# Patient Record
Sex: FEMALE | Race: WHITE | NOT HISPANIC OR LATINO | URBAN - METROPOLITAN AREA
[De-identification: names, ages, dates, MRNs, and addresses within clinical notes are randomized per-mention and may not be internally consistent; named-entity substitution may affect disease eponyms.]

---

## 2019-05-29 ENCOUNTER — APPOINTMENT (RX ONLY)
Dept: URBAN - METROPOLITAN AREA CLINIC 44 | Facility: CLINIC | Age: 74
Setting detail: DERMATOLOGY
End: 2019-05-29

## 2019-05-29 ENCOUNTER — APPOINTMENT (RX ONLY)
Dept: URBAN - METROPOLITAN AREA CLINIC 45 | Facility: CLINIC | Age: 74
Setting detail: DERMATOLOGY
End: 2019-05-29

## 2019-05-29 DIAGNOSIS — L85.3 XEROSIS CUTIS: ICD-10-CM

## 2019-05-29 DIAGNOSIS — L40.0 PSORIASIS VULGARIS: ICD-10-CM

## 2019-05-29 PROBLEM — Z92.3 PERSONAL HISTORY OF IRRADIATION: Status: ACTIVE | Noted: 2019-05-29

## 2019-05-29 PROBLEM — Z85.3 PERSONAL HISTORY OF MALIGNANT NEOPLASM OF BREAST: Status: ACTIVE | Noted: 2019-05-29

## 2019-05-29 PROBLEM — L23.7 ALLERGIC CONTACT DERMATITIS DUE TO PLANTS, EXCEPT FOOD: Status: ACTIVE | Noted: 2019-05-29

## 2019-05-29 PROBLEM — E13.9 OTHER SPECIFIED DIABETES MELLITUS WITHOUT COMPLICATIONS: Status: ACTIVE | Noted: 2019-05-29

## 2019-05-29 PROBLEM — H91.90 UNSPECIFIED HEARING LOSS, UNSPECIFIED EAR: Status: ACTIVE | Noted: 2019-05-29

## 2019-05-29 PROBLEM — E78.5 HYPERLIPIDEMIA, UNSPECIFIED: Status: ACTIVE | Noted: 2019-05-29

## 2019-05-29 PROBLEM — L70.0 ACNE VULGARIS: Status: ACTIVE | Noted: 2019-05-29

## 2019-05-29 PROBLEM — L29.8 OTHER PRURITUS: Status: ACTIVE | Noted: 2019-05-29

## 2019-05-29 PROBLEM — I10 ESSENTIAL (PRIMARY) HYPERTENSION: Status: ACTIVE | Noted: 2019-05-29

## 2019-05-29 PROBLEM — M12.9 ARTHROPATHY, UNSPECIFIED: Status: ACTIVE | Noted: 2019-05-29

## 2019-05-29 PROCEDURE — 99203 OFFICE O/P NEW LOW 30 MIN: CPT

## 2019-05-29 PROCEDURE — ? COUNSELING

## 2019-05-29 PROCEDURE — ? PRESCRIPTION

## 2019-05-29 PROCEDURE — ? ADDITIONAL NOTES

## 2019-05-29 RX ORDER — CALCIPOTRIENE 50 UG/G
1 CREAM TOPICAL BID
Qty: 1 | Refills: 3 | Status: ERX | COMMUNITY
Start: 2019-05-29

## 2019-05-29 RX ORDER — FLUOCINONIDE 0.5 MG/ML
1 SOLUTION TOPICAL QD
Qty: 1 | Refills: 3 | Status: ERX | COMMUNITY
Start: 2019-05-29

## 2019-05-29 RX ORDER — TRIAMCINOLONE ACETONIDE 1 MG/G
1 CREAM TOPICAL TWICE DAILY
Qty: 1 | Refills: 3 | Status: ERX | COMMUNITY
Start: 2019-05-29

## 2019-05-29 RX ADMIN — CALCIPOTRIENE 1: 50 CREAM TOPICAL at 13:30

## 2019-05-29 RX ADMIN — FLUOCINONIDE 1: 0.5 SOLUTION TOPICAL at 13:32

## 2019-05-29 RX ADMIN — TRIAMCINOLONE ACETONIDE 1: 1 CREAM TOPICAL at 13:33

## 2019-05-29 ASSESSMENT — LOCATION DETAILED DESCRIPTION DERM
LOCATION DETAILED: RIGHT POSTAURICULAR SKIN
LOCATION DETAILED: LEFT ANTECUBITAL SKIN
LOCATION DETAILED: RIGHT ANTECUBITAL SKIN
LOCATION DETAILED: RIGHT POSTAURICULAR SKIN
LOCATION DETAILED: RIGHT INFERIOR MEDIAL UPPER BACK
LOCATION DETAILED: RIGHT ANTECUBITAL SKIN
LOCATION DETAILED: LEFT DISTAL PRETIBIAL REGION
LOCATION DETAILED: RIGHT INFERIOR MEDIAL UPPER BACK
LOCATION DETAILED: RIGHT DISTAL PRETIBIAL REGION
LOCATION DETAILED: LEFT ANTECUBITAL SKIN
LOCATION DETAILED: LEFT DISTAL PRETIBIAL REGION
LOCATION DETAILED: RIGHT DISTAL PRETIBIAL REGION

## 2019-05-29 ASSESSMENT — LOCATION SIMPLE DESCRIPTION DERM
LOCATION SIMPLE: RIGHT PRETIBIAL REGION
LOCATION SIMPLE: LEFT PRETIBIAL REGION
LOCATION SIMPLE: RIGHT UPPER BACK
LOCATION SIMPLE: RIGHT UPPER BACK
LOCATION SIMPLE: LEFT PRETIBIAL REGION
LOCATION SIMPLE: LEFT UPPER ARM
LOCATION SIMPLE: RIGHT UPPER ARM
LOCATION SIMPLE: LEFT UPPER ARM
LOCATION SIMPLE: POSTERIOR SCALP
LOCATION SIMPLE: POSTERIOR SCALP
LOCATION SIMPLE: RIGHT PRETIBIAL REGION
LOCATION SIMPLE: RIGHT UPPER ARM

## 2019-05-29 ASSESSMENT — LOCATION ZONE DERM
LOCATION ZONE: LEG
LOCATION ZONE: TRUNK
LOCATION ZONE: SCALP
LOCATION ZONE: TRUNK
LOCATION ZONE: ARM
LOCATION ZONE: ARM
LOCATION ZONE: LEG
LOCATION ZONE: SCALP

## 2019-05-29 ASSESSMENT — BSA PSORIASIS
% BODY COVERED IN PSORIASIS: 3
% BODY COVERED IN PSORIASIS: 3

## 2019-05-29 ASSESSMENT — PGA PSORIASIS
PGA PSORIASIS: MODERATE (MODERATE PLAQUE ELEVATION, MODERATE ERYTHEMA, COARSE SCALE PREDOMINATES)
PGA PSORIASIS: MODERATE (MODERATE PLAQUE ELEVATION, MODERATE ERYTHEMA, COARSE SCALE PREDOMINATES)

## 2019-05-29 NOTE — PROCEDURE: MIPS QUALITY
Quality 111:Pneumonia Vaccination Status For Older Adults: Pneumococcal Vaccination Previously Received
Name And Contact Information For Health Care Proxy: Jose Roberto Raygoza 
Quality 130: Documentation Of Current Medications In The Medical Record: Current Medications Documented
Quality 431: Preventive Care And Screening: Unhealthy Alcohol Use - Screening: Patient screened for unhealthy alcohol use using a single question and scores less than 2 times per year
Quality 226: Preventive Care And Screening: Tobacco Use: Screening And Cessation Intervention: Patient screened for tobacco use, is a smoker AND received Cessation Counseling
Quality 47: Advance Care Plan: Advance Care Planning discussed and documented; advance care plan or surrogate decision maker documented in the medical record.
Detail Level: Detailed

## 2019-05-29 NOTE — HPI: RASH
What Type Of Note Output Would You Prefer (Optional)?: Standard Output
How Severe Is Your Rash?: mild
Is This A New Presentation, Or A Follow-Up?: Rash
Additional History: Patient was prescribed CLOBETSOL ointment by another dermatologist but her Dr. Lachelle Green took her off of it because the steroids made her blood sugar go up because she is a diabetic. Dr. Delgadillo is her PCP.

## 2019-05-29 NOTE — PROCEDURE: MIPS QUALITY
Quality 431: Preventive Care And Screening: Unhealthy Alcohol Use - Screening: Patient screened for unhealthy alcohol use using a single question and scores less than 2 times per year
Quality 111:Pneumonia Vaccination Status For Older Adults: Pneumococcal Vaccination Previously Received
Quality 226: Preventive Care And Screening: Tobacco Use: Screening And Cessation Intervention: Patient screened for tobacco use, is a smoker AND received Cessation Counseling
Name And Contact Information For Health Care Proxy: Isiah Toledo 
Detail Level: Detailed
Quality 47: Advance Care Plan: Advance Care Planning discussed and documented; advance care plan or surrogate decision maker documented in the medical record.
Quality 130: Documentation Of Current Medications In The Medical Record: Current Medications Documented

## 2019-05-29 NOTE — HPI: RASH
What Type Of Note Output Would You Prefer (Optional)?: Standard Output
How Severe Is Your Rash?: mild
Is This A New Presentation, Or A Follow-Up?: Rash
Additional History: Patient was prescribed CLOBETSOL ointment by another dermatologist but her Dr. Catrina Kuhn took her off of it because the steroids made her blood sugar go up because she is a diabetic. Dr. Tello is her PCP.

## 2019-05-29 NOTE — PROCEDURE: ADDITIONAL NOTES
Additional Notes: Continue to use Jacki cream on legs
Detail Level: Detailed
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  She states she was previously using CLOBETASOL topical but this was stopped by endocrinologist due to concerns re: hyperglycemia.\\n\\nI discussed that mainstay of this type of relatively mild to moderate psoriasis is really topical corticosteroids.  I can use less potent versions and we can attempt to improve condition with topical vitamin D analogs, but some topical steroids are simply unavoidable.  I will work with endocrinologist to try to find a reasonable plan.  Patient states she has followup with endocrinologist in 1-2 months and will discuss with her at that time. \\n\\nContinue to use TSAL shampoo on scalp.\\nStart Triamcinolone cream - apply to affected areas twice daily for 2 weeks per month \\nStart Calcipotriene - apply to affected areas twice daily \\nStart Fluocinonide solution- apply to scalp once daily for 2 weeks per month\\n\\nRec moisturizers every day, mult times, to limit flares.\\n\\nRTC 3 months.

## 2022-01-08 NOTE — PROCEDURE: ADDITIONAL NOTES
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  She states she was previously using CLOBETASOL topical but this was stopped by endocrinologist due to concerns re: hyperglycemia.\\n\\nI discussed that mainstay of this type of relatively mild to moderate psoriasis is really topical corticosteroids.  I can use less potent versions and we can attempt to improve condition with topical vitamin D analogs, but some topical steroids are simply unavoidable.  I will work with endocrinologist to try to find a reasonable plan.  Patient states she has followup with endocrinologist in 1-2 months and will discuss with her at that time. \\n\\nContinue to use TSAL shampoo on scalp.\\nStart Triamcinolone cream - apply to affected areas twice daily for 2 weeks per month \\nStart Calcipotriene - apply to affected areas twice daily \\nStart Fluocinonide solution- apply to scalp once daily for 2 weeks per month\\n\\nRec moisturizers every day, mult times, to limit flares.\\n\\nRTC 3 months.
Additional Notes: Continue to use Patricia cream on legs
Detail Level: Detailed
0